# Patient Record
(demographics unavailable — no encounter records)

---

## 2025-05-12 NOTE — DISCUSSION/SUMMARY
[de-identified] : Patient allowed to gently start resuming activities. Discussed change to medication prescription and usage. Offered cortisone steroid injection.for pain control Bracing options discussed with patient. Hyaluronic Acid inj pamphlet given to pt. try topical lidocaine for pain reviewed current medications being used by this patient Home exercise for functional improvement  RE:  KATHRINE SCHOENWANDT   Acct #- [id number]   Attention:  Nurse Reviewer /Medical Director    Based on my patient's condition, I strongly believe that the MRI R knee is medically.necessary.   The patient has failed oral meds, injections and PT and conservative treatment in combination or by themselves and therefore needs the MRI.   The MRI will dictate further treatment t recommendations.

## 2025-05-12 NOTE — PHYSICAL EXAM
[5___] : quadriceps 5[unfilled]/5 [Positive] : positive Britany [] : not mildly antalgic [Right] : right knee [All Views] : anteroposterior, lateral, skyline, and anteroposterior standing [There are no fractures, subluxations or dislocations. No significant abnormalities are seen] : There are no fractures, subluxations or dislocations. No significant abnormalities are seen [Degenerative change] : Degenerative change [TWNoteComboBox7] : flexion 120 degrees

## 2025-05-12 NOTE — HISTORY OF PRESENT ILLNESS
[Dull/Aching] : dull/aching [Sharp] : sharp [Frequent] : frequent [Leisure] : leisure [Rest] : rest [Meds] : meds [Ice] : ice [Standing] : standing [Walking] : walking [Stairs] : stairs [de-identified] : pain for one week, issues for a couple of years, no swelling, no injury, limited ability to walk, tried Tylenol, stairs feel worse  [] : no [FreeTextEntry1] : Right Knee [FreeTextEntry5] : Patient has been experiencing pain in the RIGHT KNEE. Patient has been experiencing pain for a while but over the last week the pain has increased in intensity and frequency.